# Patient Record
Sex: FEMALE | Race: WHITE | HISPANIC OR LATINO | URBAN - METROPOLITAN AREA
[De-identification: names, ages, dates, MRNs, and addresses within clinical notes are randomized per-mention and may not be internally consistent; named-entity substitution may affect disease eponyms.]

---

## 2017-10-07 ENCOUNTER — INPATIENT (INPATIENT)
Facility: HOSPITAL | Age: 27
LOS: 16 days | Discharge: HOME | End: 2017-10-24
Attending: PSYCHIATRY & NEUROLOGY | Admitting: PSYCHIATRY & NEUROLOGY
Payer: COMMERCIAL

## 2017-10-07 VITALS
TEMPERATURE: 98 F | RESPIRATION RATE: 22 BRPM | OXYGEN SATURATION: 98 % | HEART RATE: 135 BPM | DIASTOLIC BLOOD PRESSURE: 96 MMHG | SYSTOLIC BLOOD PRESSURE: 140 MMHG

## 2017-10-07 LAB
ALBUMIN SERPL ELPH-MCNC: 4.3 G/DL — SIGNIFICANT CHANGE UP (ref 3.3–5)
ALP SERPL-CCNC: 58 U/L — SIGNIFICANT CHANGE UP (ref 40–120)
ALT FLD-CCNC: 55 U/L — HIGH (ref 4–33)
AMORPH CRY # UR COMP ASSIST: SIGNIFICANT CHANGE UP (ref 0–0)
APPEARANCE UR: CLEAR — SIGNIFICANT CHANGE UP
AST SERPL-CCNC: 77 U/L — HIGH (ref 4–32)
BASE EXCESS BLDV CALC-SCNC: 1.2 MMOL/L — SIGNIFICANT CHANGE UP
BASOPHILS # BLD AUTO: 0.06 K/UL — SIGNIFICANT CHANGE UP (ref 0–0.2)
BASOPHILS NFR BLD AUTO: 0.5 % — SIGNIFICANT CHANGE UP (ref 0–2)
BILIRUB SERPL-MCNC: 0.4 MG/DL — SIGNIFICANT CHANGE UP (ref 0.2–1.2)
BILIRUB UR-MCNC: SIGNIFICANT CHANGE UP
BLOOD GAS VENOUS - CREATININE: 0.41 MG/DL — LOW (ref 0.5–1.3)
BLOOD UR QL VISUAL: NEGATIVE — SIGNIFICANT CHANGE UP
BUN SERPL-MCNC: 8 MG/DL — SIGNIFICANT CHANGE UP (ref 7–23)
CALCIUM SERPL-MCNC: 9.2 MG/DL — SIGNIFICANT CHANGE UP (ref 8.4–10.5)
CHLORIDE BLDV-SCNC: 106 MMOL/L — SIGNIFICANT CHANGE UP (ref 96–108)
CHLORIDE SERPL-SCNC: 104 MMOL/L — SIGNIFICANT CHANGE UP (ref 98–107)
CK MB BLD-MCNC: 9.65 NG/ML — HIGH (ref 1–4.7)
CK SERPL-CCNC: 2631 U/L — HIGH (ref 25–170)
CO2 SERPL-SCNC: 22 MMOL/L — SIGNIFICANT CHANGE UP (ref 22–31)
COLOR SPEC: YELLOW — SIGNIFICANT CHANGE UP
CREAT SERPL-MCNC: 0.54 MG/DL — SIGNIFICANT CHANGE UP (ref 0.5–1.3)
EOSINOPHIL # BLD AUTO: 0.12 K/UL — SIGNIFICANT CHANGE UP (ref 0–0.5)
EOSINOPHIL NFR BLD AUTO: 0.9 % — SIGNIFICANT CHANGE UP (ref 0–6)
GAS PNL BLDV: 139 MMOL/L — SIGNIFICANT CHANGE UP (ref 136–146)
GLUCOSE BLDV-MCNC: 130 — HIGH (ref 70–99)
GLUCOSE SERPL-MCNC: 124 MG/DL — HIGH (ref 70–99)
GLUCOSE UR-MCNC: NEGATIVE — SIGNIFICANT CHANGE UP
HCO3 BLDV-SCNC: 26 MMOL/L — SIGNIFICANT CHANGE UP (ref 20–27)
HCT VFR BLD CALC: 41 % — SIGNIFICANT CHANGE UP (ref 34.5–45)
HCT VFR BLDV CALC: 44.1 % — SIGNIFICANT CHANGE UP (ref 34.5–45)
HGB BLD-MCNC: 13.7 G/DL — SIGNIFICANT CHANGE UP (ref 11.5–15.5)
HGB BLDV-MCNC: 14.4 G/DL — SIGNIFICANT CHANGE UP (ref 11.5–15.5)
IMM GRANULOCYTES # BLD AUTO: 0.05 # — SIGNIFICANT CHANGE UP
IMM GRANULOCYTES NFR BLD AUTO: 0.4 % — SIGNIFICANT CHANGE UP (ref 0–1.5)
KETONES UR-MCNC: SIGNIFICANT CHANGE UP
LACTATE BLDV-MCNC: 1 MMOL/L — SIGNIFICANT CHANGE UP (ref 0.5–2)
LEUKOCYTE ESTERASE UR-ACNC: NEGATIVE — SIGNIFICANT CHANGE UP
LYMPHOCYTES # BLD AUTO: 1.76 K/UL — SIGNIFICANT CHANGE UP (ref 1–3.3)
LYMPHOCYTES # BLD AUTO: 13.8 % — SIGNIFICANT CHANGE UP (ref 13–44)
MCHC RBC-ENTMCNC: 30.4 PG — SIGNIFICANT CHANGE UP (ref 27–34)
MCHC RBC-ENTMCNC: 33.4 % — SIGNIFICANT CHANGE UP (ref 32–36)
MCV RBC AUTO: 91.1 FL — SIGNIFICANT CHANGE UP (ref 80–100)
MONOCYTES # BLD AUTO: 1.14 K/UL — HIGH (ref 0–0.9)
MONOCYTES NFR BLD AUTO: 8.9 % — SIGNIFICANT CHANGE UP (ref 2–14)
MUCOUS THREADS # UR AUTO: SIGNIFICANT CHANGE UP
NEUTROPHILS # BLD AUTO: 9.66 K/UL — HIGH (ref 1.8–7.4)
NEUTROPHILS NFR BLD AUTO: 75.5 % — SIGNIFICANT CHANGE UP (ref 43–77)
NITRITE UR-MCNC: NEGATIVE — SIGNIFICANT CHANGE UP
NRBC # FLD: 0 — SIGNIFICANT CHANGE UP
PCO2 BLDV: 38 MMHG — LOW (ref 41–51)
PH BLDV: 7.44 PH — HIGH (ref 7.32–7.43)
PH UR: 6 — SIGNIFICANT CHANGE UP (ref 4.6–8)
PLATELET # BLD AUTO: 312 K/UL — SIGNIFICANT CHANGE UP (ref 150–400)
PMV BLD: 10.8 FL — SIGNIFICANT CHANGE UP (ref 7–13)
PO2 BLDV: 68 MMHG — HIGH (ref 35–40)
POTASSIUM BLDV-SCNC: 3.1 MMOL/L — LOW (ref 3.4–4.5)
POTASSIUM SERPL-MCNC: 3.4 MMOL/L — LOW (ref 3.5–5.3)
POTASSIUM SERPL-SCNC: 3.4 MMOL/L — LOW (ref 3.5–5.3)
PROT SERPL-MCNC: 7.8 G/DL — SIGNIFICANT CHANGE UP (ref 6–8.3)
PROT UR-MCNC: 30 — HIGH
RBC # BLD: 4.5 M/UL — SIGNIFICANT CHANGE UP (ref 3.8–5.2)
RBC # FLD: 12.3 % — SIGNIFICANT CHANGE UP (ref 10.3–14.5)
RBC CASTS # UR COMP ASSIST: SIGNIFICANT CHANGE UP (ref 0–?)
SAO2 % BLDV: 94.7 % — HIGH (ref 60–85)
SODIUM SERPL-SCNC: 142 MMOL/L — SIGNIFICANT CHANGE UP (ref 135–145)
SP GR SPEC: 1.03 — HIGH (ref 1–1.03)
TROPONIN T SERPL-MCNC: < 0.06 NG/ML — SIGNIFICANT CHANGE UP (ref 0–0.06)
UROBILINOGEN FLD QL: >8 E.U. — SIGNIFICANT CHANGE UP (ref 0.1–0.2)
WBC # BLD: 12.79 K/UL — HIGH (ref 3.8–10.5)
WBC # FLD AUTO: 12.79 K/UL — HIGH (ref 3.8–10.5)
WBC UR QL: SIGNIFICANT CHANGE UP (ref 0–?)

## 2017-10-07 PROCEDURE — 71010: CPT | Mod: 26

## 2017-10-07 PROCEDURE — 70450 CT HEAD/BRAIN W/O DYE: CPT | Mod: 26

## 2017-10-07 RX ORDER — SODIUM CHLORIDE 9 MG/ML
1000 INJECTION INTRAMUSCULAR; INTRAVENOUS; SUBCUTANEOUS ONCE
Qty: 0 | Refills: 0 | Status: COMPLETED | OUTPATIENT
Start: 2017-10-07 | End: 2017-10-07

## 2017-10-07 RX ORDER — ACETAMINOPHEN 500 MG
1000 TABLET ORAL ONCE
Qty: 0 | Refills: 0 | Status: COMPLETED | OUTPATIENT
Start: 2017-10-07 | End: 2017-10-07

## 2017-10-07 RX ADMIN — SODIUM CHLORIDE 2000 MILLILITER(S): 9 INJECTION INTRAMUSCULAR; INTRAVENOUS; SUBCUTANEOUS at 22:57

## 2017-10-07 RX ADMIN — Medication 400 MILLIGRAM(S): at 23:44

## 2017-10-07 NOTE — ED PROVIDER NOTE - PHYSICAL EXAMINATION
Gen: NAD, A&O x 1, speaking coherent sentences unrealted to asked questions  Head: NCAT  HEENT: PERRL, oral mucosa moist, normal conjunctiva  Lung: CTAB, no respiratory distress  CV: tachycardic, no murmurs, Normal perfusion  Abd: soft, NTND, no CVA tenderness  MSK: No edema, no visible deformities.  extremities are rigid, but full ROM  Skin: erythema of right hip.  No fluctuance.  scattered excoriations on left arm  Psych: confused  - Prisca Payne, DO

## 2017-10-07 NOTE — ED ADULT NURSE NOTE - OBJECTIVE STATEMENT
Pt received to rm 25 non verbal and non ambulatory. pt has PMH of paranoid schizophrenia. and spastic dysplasia. Has not  slept in four days. pt is on Abilify. As per father pt  says she is very weak and she is not able to walk all day. as per father pt was screaming at the house that she needs help. pt sinus tach on monitor. pt family states she has had an episode like this 2 years ago. Awaiting further eval, will continue to monitor. Pt received to rm 25 non verbal and non ambulatory. pt has PMH of paranoid schizophrenia. and spastic dysplasia. Has not  slept in four days. pt is on Abilify. As per father pt  says she is very weak and she is not able to walk all day. as per father pt was screaming at the house that she needs help. pt sinus tach on monitor. pt noted to have 4cm/4cm abscess on rt hip area. pt family states she has had an episode like this 2 years ago. Awaiting further eval, will continue to monitor.

## 2017-10-07 NOTE — ED PROVIDER NOTE - ATTENDING CONTRIBUTION TO CARE
DELORIS Attending Note - Dr. Toth 27 F pmh schizophrenia, CP p/w inability to sleep, more combative, confused x 4 days similar to previous presentations requiring admission to [facility]   PE: pt is alert but minimally verbal perrl, ent normal, membranes are dry, neck supple. no lymphadenopathy or thyroid enlargement, No JVD.  Chest clear to P&A, Heart- reg rhythm without murmur, rubs or gallops, radial pulses equal bilaterally.  Abd is soft, non-tender, Bowel sounds are active. no mass or organomegaly. : No CVA tenderness. Neuro:  Pt alert and oriented x 3. Perrl    Distal neurosensory is intact. Motor function is 5/5 strength bilaterally.  No focal deficits. Extremities:  No edema.  Skin: warm and dry with indurated lesion on right hip with surrounding erythema..   Impression:  Change in mental status probably psychiatric but with EKG global t wave inversions-  Plan: telemetry admission with psychiatry follow up.

## 2017-10-07 NOTE — ED PROVIDER NOTE - OBJECTIVE STATEMENT
27 F pmh schizophrenia, CP p/w inability to sleep, more combative x 4 days.  Per father patient has had trouble and is refusing to ambulate today.  Normally patient is able to ambulate and bathe on her own.   Pt denies pain.  family denies fever/chills, nausea/vomiting/diarrhea, shortness of breath, chest pain.    Father noticed lesion on right "hip bone" today.  Pt was hospitalized at [facility] 2 years ago and has been on abilify every since.   - Prisca Payne DO 27 F pmh schizophrenia, CP p/w inability to sleep, more combative, confused x 4 days.  Per family, patient has had trouble and is refusing to ambulate today.  family has found her undressed on the floor numerous times over past few days.  Normally patient is able to ambulate and bathe on her own.   Pt selectively answers questions, but mostly makes confused comments.  Per family this is far from the pt's baseline.  family denies patient having fever/chills, nausea/vomiting/diarrhea, shortness of breath, chest pain.    Father noticed lesion/redness on right "hip bone" today.  Pt was hospitalized at [facility] 2 years ago and has been on abilify every since.  This is a very similar presentation to the last time she required [facility] admission.     - Prisca Payne DO

## 2017-10-07 NOTE — ED PROVIDER NOTE - CONSTITUTIONAL, MLM
normal... Well appearing, well nourished female lying on stretcher in no apparent distress but minimally talkative.

## 2017-10-07 NOTE — ED PROVIDER NOTE - MEDICAL DECISION MAKING DETAILS
27 F pmh schizophrenia, CP p/w change in behavior for past few days.  Pt is alert but confused, tachycardic, tachypnic on exam.  Will obtain labs, tox screen, CE, ekg, cxr, CT head to r/o bleed, urine, give fluids, analgesia and re-asses.  If medical work up is negative, patient will be admitted to psychiatry.  - Prisca Payne DO

## 2017-10-07 NOTE — ED ADULT TRIAGE NOTE - CHIEF COMPLAINT QUOTE
PMH of paranoid schzophrenia. and spastic dysplasia. Has not  slept in four days. pt is on abilify. As per father pt  says she is very weak and she is not able to walk all day. pt is cooperative during triage but does not offer much information . as per father pt was screaming at the house that she needs help. HR during triage 135-140. denies any chest pain

## 2017-10-07 NOTE — ED PROVIDER NOTE - ENMT, MLM
Airway patent, Nose No congestion, throat- membranes dry No swelling or exudate. Neck supple. No JVD, No lymphadenopathy  .

## 2017-10-07 NOTE — ED PROVIDER NOTE - PROGRESS NOTE DETAILS
No drainable collection visible on ultrasound of the right hip.    - Prisca Payne DO repeat EKG shows only t wave inversion only in V3 instead of diffuse.  HR and RR have normalized.  BH called. kelly do: pt signed out by dr hawley. pt seen and evaluated at bedside. pt stable, with recurrent tachy cardia, tachypnea. family states same symptoms to previous episode of catatonic phase 2 years ago. family states pt would complain if felt cp. currently no complaints of cp .

## 2017-10-08 DIAGNOSIS — R41.82 ALTERED MENTAL STATUS, UNSPECIFIED: ICD-10-CM

## 2017-10-08 LAB
AMPHET UR-MCNC: NEGATIVE — SIGNIFICANT CHANGE UP
APAP SERPL-MCNC: < 15 UG/ML — LOW (ref 15–25)
BARBITURATES MEASUREMENT: NEGATIVE — SIGNIFICANT CHANGE UP
BARBITURATES UR SCN-MCNC: NEGATIVE — SIGNIFICANT CHANGE UP
BENZODIAZ SERPL-MCNC: NEGATIVE — SIGNIFICANT CHANGE UP
BENZODIAZ UR-MCNC: NEGATIVE — SIGNIFICANT CHANGE UP
CANNABINOIDS UR-MCNC: NEGATIVE — SIGNIFICANT CHANGE UP
COCAINE METAB.OTHER UR-MCNC: NEGATIVE — SIGNIFICANT CHANGE UP
D DIMER BLD IA.RAPID-MCNC: 254 NG/ML — SIGNIFICANT CHANGE UP
ETHANOL BLD-MCNC: < 10 MG/DL — SIGNIFICANT CHANGE UP
METHADONE UR-MCNC: NEGATIVE — SIGNIFICANT CHANGE UP
OPIATES UR-MCNC: NEGATIVE — SIGNIFICANT CHANGE UP
OXYCODONE UR-MCNC: NEGATIVE — SIGNIFICANT CHANGE UP
PCP UR-MCNC: NEGATIVE — SIGNIFICANT CHANGE UP
SALICYLATES SERPL-MCNC: < 5 MG/DL — LOW (ref 15–30)
TSH SERPL-MCNC: 1.82 UIU/ML — SIGNIFICANT CHANGE UP (ref 0.27–4.2)

## 2017-10-08 PROCEDURE — 99285 EMERGENCY DEPT VISIT HI MDM: CPT

## 2017-10-08 RX ORDER — HYDROXYZINE HCL 10 MG
50 TABLET ORAL ONCE
Qty: 0 | Refills: 0 | Status: COMPLETED | OUTPATIENT
Start: 2017-10-08 | End: 2017-10-08

## 2017-10-08 RX ORDER — CEPHALEXIN 500 MG
500 CAPSULE ORAL ONCE
Qty: 0 | Refills: 0 | Status: COMPLETED | OUTPATIENT
Start: 2017-10-08 | End: 2017-10-08

## 2017-10-08 RX ORDER — ARIPIPRAZOLE 15 MG/1
15 TABLET ORAL DAILY
Qty: 0 | Refills: 0 | Status: DISCONTINUED | OUTPATIENT
Start: 2017-10-08 | End: 2017-10-24

## 2017-10-08 RX ORDER — HALOPERIDOL DECANOATE 100 MG/ML
5 INJECTION INTRAMUSCULAR ONCE
Qty: 0 | Refills: 0 | Status: DISCONTINUED | OUTPATIENT
Start: 2017-10-08 | End: 2017-10-24

## 2017-10-08 RX ORDER — ARIPIPRAZOLE 15 MG/1
15 TABLET ORAL DAILY
Qty: 0 | Refills: 0 | Status: DISCONTINUED | OUTPATIENT
Start: 2017-10-08 | End: 2017-10-08

## 2017-10-08 RX ORDER — BENZTROPINE MESYLATE 1 MG
1 TABLET ORAL ONCE
Qty: 0 | Refills: 0 | Status: DISCONTINUED | OUTPATIENT
Start: 2017-10-08 | End: 2017-10-24

## 2017-10-08 RX ORDER — BENZTROPINE MESYLATE 1 MG
0.5 TABLET ORAL
Qty: 0 | Refills: 0 | Status: COMPLETED | OUTPATIENT
Start: 2017-10-08 | End: 2017-10-08

## 2017-10-08 RX ORDER — SODIUM CHLORIDE 9 MG/ML
1000 INJECTION INTRAMUSCULAR; INTRAVENOUS; SUBCUTANEOUS ONCE
Qty: 0 | Refills: 0 | Status: COMPLETED | OUTPATIENT
Start: 2017-10-08 | End: 2017-10-08

## 2017-10-08 RX ADMIN — SODIUM CHLORIDE 2000 MILLILITER(S): 9 INJECTION INTRAMUSCULAR; INTRAVENOUS; SUBCUTANEOUS at 01:42

## 2017-10-08 RX ADMIN — Medication 500 MILLIGRAM(S): at 01:22

## 2017-10-08 RX ADMIN — Medication 50 MILLIGRAM(S): at 23:30

## 2017-10-08 RX ADMIN — Medication 1000 MILLIGRAM(S): at 01:14

## 2017-10-08 RX ADMIN — Medication 0.5 MILLIGRAM(S): at 21:01

## 2017-10-08 RX ADMIN — Medication 2 MILLIGRAM(S): at 06:27

## 2017-10-08 NOTE — ED BEHAVIORAL HEALTH ASSESSMENT NOTE - OTHER PAST PSYCHIATRIC HISTORY (INCLUDE DETAILS REGARDING ONSET, COURSE OF ILLNESS, INPATIENT/OUTPATIENT TREATMENT)
Hospitalization with diagnosis of Schizophrenia about 5 years ago. Hospitalization with diagnosis of Schizophrenia about 2 years ago. 2 psych admissions No Hx of SA, was in treatment in [facility] [place] under care of Dr Osullivan Last seen in Jan 2017

## 2017-10-08 NOTE — ED ADULT NURSE REASSESSMENT NOTE - NS ED NURSE REASSESS COMMENT FT1
Pt a&ox2, breathing unlabored, vs as noted. Pt being transferred to  for psych evaluation. Pt calm and cooperative.

## 2017-10-08 NOTE — ED BEHAVIORAL HEALTH ASSESSMENT NOTE - OTHER
NEENA cerebral palsy, in wheelchair cerebral palsy selectively mute constricted disorganized, internally preoccupied with long latency to speech or selective mutism - unable to assess TP unable to assess internally preoccupied NENEA father psychosis pt is taking her meds but not seen by a psychiatrist for 8 month because insurance issues

## 2017-10-08 NOTE — ED BEHAVIORAL HEALTH ASSESSMENT NOTE - HPI (INCLUDE ILLNESS QUALITY, SEVERITY, DURATION, TIMING, CONTEXT, MODIFYING FACTORS, ASSOCIATED SIGNS AND SYMPTOMS)
Case of 35 y/o woman with Cerebral Palsy and a diagnosis of Schizophrenia was brought in by her parents for a psychiatric evaluation.  She lives with them, is unemployed, and has a history of one previous psychiatric hospitalization about 5 years ago for similar symptoms.  She had been in her usual state of health, stabilized on Seroquel and she started to be non-compliant with her Seroquel.  Parents found that she was starting to stay awake at night as well as go into the bathroom frequently.  They found she had been placing Seroquel tabs under the toaster instead of taking them.  Patient was selectively mute for the interview and appeared internally preoccupied.  Parents stated that she was not sleeping and that they had to take turns watching her.  They also reported that her appetite is about half of what it is usually, and that she is taking a very long time to eat her food, which is not like her.  Patient was going into the bathroom "all the time" and they found that she was urinating, then drinking water out of the sink "as much as she could."  Patient would not answer questions about it.  Father also reported that he found his straight razor broken in the garbage, and that in questioning the patient found out that the patient had shaved her underarms and her pubic area.  Patient again would not answer questions about it.  Brother was contacted who gave history that the patient had hidden some razors around her room, in drawer, in jewelry box, etc.  Patient denied wanting to hurt herself or kill herself, had told family she just wanted to shave.    Parents report that the patient may have become upset a few weeks ago when one of her brothers moved in with the Grandmother and the patient started to worry that she would be left all alone with no one to care for her.  She denied any depressed mood or anxiety.  She denied having any different thoughts about her body or touching herself in any different way, but she did place her left hand over her right breast at one point during the interview, then slid it off.  Patient denied any AH/VH/PI but did appear internally preoccupied during the interview.  She was selectively mute but did answer some simple questions with "no".  The brother also gave history that the patient had been speaking non-sensically while they were having conversation.  Patient is psychotic and unable to communicate her needs effectively at this time.  Her psychosis also interferes with her properly caring for herself.  Due to these, she is an acute danger to herself at this time and requires inpatient psychiatric hospitalization. Case of 26 y/o woman with Cerebral Palsy and a diagnosis of Schizophrenia was brought in by her parents for a psychiatric evaluation.  She lives with them, is unemployed, and has a history of one previous psychiatric hospitalization about 2 years ago for similar symptoms.  Patient has not been seen by a psychiatrist for 8 month, but father claims that patient has been compliant with her meds.  Parents found that she was starting to stay awake at night.  Patient was selectively mute for the interview and appeared internally preoccupied.  She reported that she is here because she has parkinson's.  Parents also reported that patient "makes no sense sometimes; she interrupts others and says "something which does not make sense, she can start talking about some event which happened 6 month ago"  Recently patient has been more irritable and as per the father she was found naked in her room on the floor "couple of times" ; he states she can walk naked at home. He also reports that her daughter talks to herself and  that her appetite is about half of what it is usually, and that she is taking a very long time to eat her food, which is not like her.   patient reports that he does not cry ; denies being angry; states that she does not want to hurt anyone or herself, She denies hearing voices, no visions or delusions.  She denied any depressed mood or anxiety.  She appears internally preoccupied during the interview.  She was selectively mute but did answer some simple questions with "no".

## 2017-10-08 NOTE — ED BEHAVIORAL HEALTH ASSESSMENT NOTE - RISK ASSESSMENT
Patient is psychotic and unable to communicate her needs effectively at this time.  Her psychosis also interferes with her properly caring for herself.  Due to these, she is an acute danger to herself at this time and requires inpatient psychiatric hospitalization.

## 2017-10-08 NOTE — ED BEHAVIORAL HEALTH ASSESSMENT NOTE - SUMMARY
Case of 24 y/o woman with CP and Schizophrenia who has been non-compliant with her antipsychotic medications for at least one week.  Now she presents with internal preoccupation, disorganized thinking, bizarre behavior, and selective mutism.  Patient is psychotic and unable to communicate her needs effectively at this time.  Her psychosis also interferes with her properly caring for herself.  Due to these, she is an acute danger to herself at this time and requires inpatient psychiatric hospitalization. Case of 28 y/o woman with CP and Schizophrenia 2 prior psych admissions, no SA ; domiciled with her family. who has been compliant with her meds, but not under care of a psychiatrist lately LAst seen in Jan 2017.  Now she presents with internal preoccupation, disorganized thinking, bizarre behavior, and selective mutism.  Patient is psychotic and unable to communicate her needs effectively at this time.  Her psychosis also interferes with her properly caring for herself.  Due to these, she is an acute danger to herself at this time and requires inpatient psychiatric hospitalization.

## 2017-10-09 PROCEDURE — 93010 ELECTROCARDIOGRAM REPORT: CPT

## 2017-10-09 PROCEDURE — 99222 1ST HOSP IP/OBS MODERATE 55: CPT

## 2017-10-09 RX ORDER — BENZTROPINE MESYLATE 1 MG
1 TABLET ORAL
Qty: 0 | Refills: 0 | Status: DISCONTINUED | OUTPATIENT
Start: 2017-10-09 | End: 2017-10-24

## 2017-10-09 RX ADMIN — Medication 1 MILLIGRAM(S): at 12:46

## 2017-10-09 RX ADMIN — Medication 1 MILLIGRAM(S): at 20:09

## 2017-10-09 RX ADMIN — ARIPIPRAZOLE 15 MILLIGRAM(S): 15 TABLET ORAL at 08:43

## 2017-10-10 DIAGNOSIS — F06.1 CATATONIC DISORDER DUE TO KNOWN PHYSIOLOGICAL CONDITION: ICD-10-CM

## 2017-10-10 DIAGNOSIS — R00.0 TACHYCARDIA, UNSPECIFIED: ICD-10-CM

## 2017-10-10 DIAGNOSIS — R94.31 ABNORMAL ELECTROCARDIOGRAM [ECG] [EKG]: ICD-10-CM

## 2017-10-10 DIAGNOSIS — M62.82 RHABDOMYOLYSIS: ICD-10-CM

## 2017-10-10 LAB
ALBUMIN SERPL ELPH-MCNC: 4.3 G/DL — SIGNIFICANT CHANGE UP (ref 3.3–5)
ALP SERPL-CCNC: 53 U/L — SIGNIFICANT CHANGE UP (ref 40–120)
ALT FLD-CCNC: 43 U/L — HIGH (ref 4–33)
AST SERPL-CCNC: 38 U/L — HIGH (ref 4–32)
BILIRUB SERPL-MCNC: 0.6 MG/DL — SIGNIFICANT CHANGE UP (ref 0.2–1.2)
BUN SERPL-MCNC: 4 MG/DL — LOW (ref 7–23)
CALCIUM SERPL-MCNC: 9.5 MG/DL — SIGNIFICANT CHANGE UP (ref 8.4–10.5)
CHLORIDE SERPL-SCNC: 101 MMOL/L — SIGNIFICANT CHANGE UP (ref 98–107)
CK SERPL-CCNC: 608 U/L — HIGH (ref 25–170)
CO2 SERPL-SCNC: 22 MMOL/L — SIGNIFICANT CHANGE UP (ref 22–31)
CREAT SERPL-MCNC: 0.6 MG/DL — SIGNIFICANT CHANGE UP (ref 0.5–1.3)
GLUCOSE SERPL-MCNC: 97 MG/DL — SIGNIFICANT CHANGE UP (ref 70–99)
HCT VFR BLD CALC: 44.2 % — SIGNIFICANT CHANGE UP (ref 34.5–45)
HGB BLD-MCNC: 14.4 G/DL — SIGNIFICANT CHANGE UP (ref 11.5–15.5)
MCHC RBC-ENTMCNC: 30.6 PG — SIGNIFICANT CHANGE UP (ref 27–34)
MCHC RBC-ENTMCNC: 32.6 % — SIGNIFICANT CHANGE UP (ref 32–36)
MCV RBC AUTO: 94 FL — SIGNIFICANT CHANGE UP (ref 80–100)
NRBC # FLD: 0 — SIGNIFICANT CHANGE UP
PLATELET # BLD AUTO: 255 K/UL — SIGNIFICANT CHANGE UP (ref 150–400)
PMV BLD: 11.5 FL — SIGNIFICANT CHANGE UP (ref 7–13)
POTASSIUM SERPL-MCNC: 3.9 MMOL/L — SIGNIFICANT CHANGE UP (ref 3.5–5.3)
POTASSIUM SERPL-SCNC: 3.9 MMOL/L — SIGNIFICANT CHANGE UP (ref 3.5–5.3)
PROT SERPL-MCNC: 7.9 G/DL — SIGNIFICANT CHANGE UP (ref 6–8.3)
RBC # BLD: 4.7 M/UL — SIGNIFICANT CHANGE UP (ref 3.8–5.2)
RBC # FLD: 12.5 % — SIGNIFICANT CHANGE UP (ref 10.3–14.5)
SODIUM SERPL-SCNC: 142 MMOL/L — SIGNIFICANT CHANGE UP (ref 135–145)
WBC # BLD: 9.35 K/UL — SIGNIFICANT CHANGE UP (ref 3.8–10.5)
WBC # FLD AUTO: 9.35 K/UL — SIGNIFICANT CHANGE UP (ref 3.8–10.5)

## 2017-10-10 PROCEDURE — 99232 SBSQ HOSP IP/OBS MODERATE 35: CPT

## 2017-10-10 PROCEDURE — 99223 1ST HOSP IP/OBS HIGH 75: CPT

## 2017-10-10 RX ADMIN — ARIPIPRAZOLE 15 MILLIGRAM(S): 15 TABLET ORAL at 10:56

## 2017-10-10 RX ADMIN — Medication 30 MILLILITER(S): at 21:27

## 2017-10-10 RX ADMIN — Medication 1 MILLIGRAM(S): at 21:27

## 2017-10-10 RX ADMIN — Medication 1 MILLIGRAM(S): at 15:24

## 2017-10-10 RX ADMIN — Medication 1 MILLIGRAM(S): at 10:41

## 2017-10-10 NOTE — CONSULT NOTE ADULT - PROBLEM SELECTOR RECOMMENDATION 2
-Isolated t wave inversion in V3  -Patient denies any chest pain or SOB  -No prior cardiac history and negative troponin in ED  -Monitor for now

## 2017-10-10 NOTE — CONSULT NOTE ADULT - PROBLEM SELECTOR RECOMMENDATION 4
-Improving. Patient worked with PT yesterday and with overall improving PO take  -management per psych  -case discussed with Dr Barahona.

## 2017-10-10 NOTE — CONSULT NOTE ADULT - SUBJECTIVE AND OBJECTIVE BOX
HPI: 27F PMH of schizophrenia, cerebral palsy p/w altered mental status. Per family patient was not sleeping, refusing to ambulate and was selectively conversing. In ED patient was found to have tachycardia which improved with IV hydration and she was found to be medically stable to DC to [facility]. Here she has improved and is able to answer some questions. Currently she denies any discomfort, any chest pain or SOB. Yesterday she worked with PT and today she is eating more per the staff. History otherwise was limited to obtain.      PAST MEDICAL & SURGICAL HISTORY:  Schizophrenia  Cerebral palsy  No significant past surgical history      Review of Systems:   Only able to obtain limited history as patient selective answers questions.     Allergies    No Known Drug Allergies  peanuts (Anaphylaxis; Hives)    Social History: Lives with father in private residence. Needs wheelchair at home.     FAMILY HISTORY:  No pertinent family history in first degree relatives      MEDICATIONS  (STANDING):  ARIPiprazole Solution 15 milliGRAM(s) Oral daily  benztropine 1 milliGRAM(s) Oral two times a day  LORazepam     Tablet 1 milliGRAM(s) Oral three times a day    MEDICATIONS  (PRN):  benztropine Injectable 1 milliGRAM(s) IntraMuscular Once PRN Extrapyramidal symptoms  haloperidol    Injectable 5 milliGRAM(s) IntraMuscular Once PRN severe agitation  LORazepam   Injectable 2 milliGRAM(s) IntraMuscular once PRN severe agitation      Vital Signs Last 24 Hrs  T(C): 37 (10 Oct 2017 06:55), Max: 37 (10 Oct 2017 06:55)  T(F): 98.6 (10 Oct 2017 06:55), Max: 98.6 (10 Oct 2017 06:55)  HR: 103 (10 Oct 2017 06:55) (103 - 103)  BP: 134/96 (10 Oct 2017 06:55) (134/96 - 134/96)  BP(mean): --  RR: --  SpO2: --    PHYSICAL EXAM:  GENERAL: NAD, well-developed  HEAD:  Atraumatic, Normocephalic  EYES: EOMI, PERRLA, conjunctiva and sclera clear  NECK: Supple, No JVD  CHEST/LUNG: Clear to auscultation bilaterally; No wheeze  HEART: + tachycardia   ABDOMEN: Soft, Nontender, Nondistended; Bowel sounds present  EXTREMITIES:  2+ Peripheral Pulses, No clubbing, cyanosis, or edema  Psych - calm  NEUROLOGY: non-focal  SKIN: No rashes or lesions    LABS:                        14.4   9.35  )-----------( 255      ( 10 Oct 2017 08:20 )             44.2         EKG(personally reviewed): Sinus tach  only isolated t wave inversion seen in V3     RADIOLOGY & ADDITIONAL TESTS:    Imaging Personally Reviewed:    Consultant(s) Notes Reviewed:      Care Discussed with Consultants/Other Providers: HPI: 27F PMH of schizophrenia, cerebral palsy p/w altered mental status. Per family patient was not sleeping, refusing to ambulate and was selectively conversing. In ED patient was found to have tachycardia which improved with IV hydration and she was found to be medically stable to DC to [facility]. Here she has improved and is able to answer some questions. Currently she denies any discomfort, any chest pain or SOB. Yesterday she worked with PT and today she is eating more per the staff. History otherwise was limited to obtain.      PAST MEDICAL & SURGICAL HISTORY:  Schizophrenia  Cerebral palsy  No significant past surgical history      Review of Systems:   Only able to obtain limited history as patient selective answers questions.     Allergies    No Known Drug Allergies  peanuts (Anaphylaxis; Hives)    Social History: Lives with father in private residence. Needs wheelchair at home.     FAMILY HISTORY:  No pertinent family history in first degree relatives      MEDICATIONS  (STANDING):  ARIPiprazole Solution 15 milliGRAM(s) Oral daily  benztropine 1 milliGRAM(s) Oral two times a day  LORazepam     Tablet 1 milliGRAM(s) Oral three times a day    MEDICATIONS  (PRN):  benztropine Injectable 1 milliGRAM(s) IntraMuscular Once PRN Extrapyramidal symptoms  haloperidol    Injectable 5 milliGRAM(s) IntraMuscular Once PRN severe agitation  LORazepam   Injectable 2 milliGRAM(s) IntraMuscular once PRN severe agitation      Vital Signs Last 24 Hrs  T(C): 37 (10 Oct 2017 06:55), Max: 37 (10 Oct 2017 06:55)  T(F): 98.6 (10 Oct 2017 06:55), Max: 98.6 (10 Oct 2017 06:55)  HR: 103 (10 Oct 2017 06:55) (103 - 103)  BP: 134/96 (10 Oct 2017 06:55) (134/96 - 134/96)  BP(mean): --  RR: --  SpO2: --    PHYSICAL EXAM:  GENERAL: NAD, well-developed  HEAD:  Atraumatic, Normocephalic  EYES: EOMI, PERRLA, conjunctiva and sclera clear  NECK: Supple, No JVD  CHEST/LUNG: Clear to auscultation bilaterally; No wheeze  HEART: + tachycardia   ABDOMEN: Soft, Nontender, Nondistended; Bowel sounds present  EXTREMITIES:  2+ Peripheral Pulses, No clubbing, cyanosis, or edema  Psych - calm  NEUROLOGY: non-focal  SKIN: No rashes or lesions    LABS:                        14.4   9.35  )-----------( 255      ( 10 Oct 2017 08:20 )             44.2     10-10    142  |  101  |  4<L>  ----------------------------<  97  3.9   |  22  |  0.60    Ca    9.5      10 Oct 2017 08:20    TPro  7.9  /  Alb  4.3  /  TBili  0.6  /  DBili  x   /  AST  38<H>  /  ALT  43<H>  /  AlkPhos  53  10-10        EKG(personally reviewed): Sinus tach  only isolated t wave inversion seen in V3     RADIOLOGY & ADDITIONAL TESTS:    Imaging Personally Reviewed:    Consultant(s) Notes Reviewed:      Care Discussed with Consultants/Other Providers:

## 2017-10-10 NOTE — CONSULT NOTE ADULT - PROBLEM SELECTOR RECOMMENDATION 3
-elevated CK  -Repeat CK pending today  -Encourage oral hydration if tolerates PO intake. Per family patient able to tolerate thin liquids at home. -elevated CK  -CK downtrending today  -Encourage oral hydration if tolerates PO intake. Per family patient able to tolerate thin liquids at home.

## 2017-10-10 NOTE — CONSULT NOTE ADULT - PROBLEM SELECTOR RECOMMENDATION 9
-Likely in setting of poor PO intake  -patient denies any pain or discomfort  -Appears dehydrated upon review of labs - elevated specific gravity and elevated CK  -As per mental status is improving, I recommend encouraging PO intake. Trial of fluids if able to tolerate. -Likely in setting of poor PO intake  -patient denies any pain or discomfort  -Appears dehydrated upon review of labs - elevated specific gravity and elevated CK  -As per mental status is improving, I recommend encouraging PO intake. Trial of fluids if able to tolerate. BMP reviewed from this morning and is stable.

## 2017-10-10 NOTE — CONSULT NOTE ADULT - ASSESSMENT
27F PMH of schizophrenia, cerebral palsy p/w altered mental status found to have sinus tach likely due to dehydration in setting of poor PO take.

## 2017-10-11 PROCEDURE — 99232 SBSQ HOSP IP/OBS MODERATE 35: CPT

## 2017-10-11 RX ORDER — POLYETHYLENE GLYCOL 3350 17 G/17G
17 POWDER, FOR SOLUTION ORAL ONCE
Qty: 0 | Refills: 0 | Status: COMPLETED | OUTPATIENT
Start: 2017-10-11 | End: 2017-10-12

## 2017-10-11 RX ADMIN — Medication 1 MILLIGRAM(S): at 08:10

## 2017-10-11 RX ADMIN — ARIPIPRAZOLE 15 MILLIGRAM(S): 15 TABLET ORAL at 08:10

## 2017-10-11 RX ADMIN — Medication 1 MILLIGRAM(S): at 21:17

## 2017-10-11 RX ADMIN — Medication 1 MILLIGRAM(S): at 12:33

## 2017-10-12 PROCEDURE — 99231 SBSQ HOSP IP/OBS SF/LOW 25: CPT

## 2017-10-12 RX ADMIN — Medication 1 MILLIGRAM(S): at 21:45

## 2017-10-12 RX ADMIN — Medication 1 MILLIGRAM(S): at 08:27

## 2017-10-12 RX ADMIN — ARIPIPRAZOLE 15 MILLIGRAM(S): 15 TABLET ORAL at 08:27

## 2017-10-12 RX ADMIN — POLYETHYLENE GLYCOL 3350 17 GRAM(S): 17 POWDER, FOR SOLUTION ORAL at 21:00

## 2017-10-12 RX ADMIN — Medication 1 MILLIGRAM(S): at 13:25

## 2017-10-13 PROCEDURE — 99232 SBSQ HOSP IP/OBS MODERATE 35: CPT

## 2017-10-13 RX ADMIN — Medication 1 MILLIGRAM(S): at 12:41

## 2017-10-13 RX ADMIN — ARIPIPRAZOLE 15 MILLIGRAM(S): 15 TABLET ORAL at 08:21

## 2017-10-13 RX ADMIN — Medication 1 MILLIGRAM(S): at 21:44

## 2017-10-13 RX ADMIN — Medication 1 MILLIGRAM(S): at 08:21

## 2017-10-14 PROCEDURE — 99232 SBSQ HOSP IP/OBS MODERATE 35: CPT

## 2017-10-14 RX ADMIN — Medication 1 MILLIGRAM(S): at 13:39

## 2017-10-14 RX ADMIN — ARIPIPRAZOLE 15 MILLIGRAM(S): 15 TABLET ORAL at 09:38

## 2017-10-14 RX ADMIN — Medication 1 MILLIGRAM(S): at 20:24

## 2017-10-14 RX ADMIN — Medication 1 MILLIGRAM(S): at 09:38

## 2017-10-15 PROCEDURE — 99232 SBSQ HOSP IP/OBS MODERATE 35: CPT

## 2017-10-15 RX ADMIN — Medication 1 MILLIGRAM(S): at 13:42

## 2017-10-15 RX ADMIN — Medication 1 MILLIGRAM(S): at 09:23

## 2017-10-15 RX ADMIN — ARIPIPRAZOLE 15 MILLIGRAM(S): 15 TABLET ORAL at 09:23

## 2017-10-15 RX ADMIN — Medication 1 MILLIGRAM(S): at 21:14

## 2017-10-16 PROCEDURE — 99231 SBSQ HOSP IP/OBS SF/LOW 25: CPT

## 2017-10-16 RX ADMIN — Medication 1 MILLIGRAM(S): at 22:03

## 2017-10-16 RX ADMIN — Medication 1 MILLIGRAM(S): at 12:44

## 2017-10-16 RX ADMIN — ARIPIPRAZOLE 15 MILLIGRAM(S): 15 TABLET ORAL at 10:25

## 2017-10-16 RX ADMIN — Medication 1 MILLIGRAM(S): at 10:25

## 2017-10-17 PROCEDURE — 99231 SBSQ HOSP IP/OBS SF/LOW 25: CPT

## 2017-10-17 RX ADMIN — Medication 1 MILLIGRAM(S): at 22:21

## 2017-10-17 RX ADMIN — Medication 1 MILLIGRAM(S): at 13:11

## 2017-10-17 RX ADMIN — Medication 1 MILLIGRAM(S): at 08:12

## 2017-10-17 RX ADMIN — ARIPIPRAZOLE 15 MILLIGRAM(S): 15 TABLET ORAL at 08:12

## 2017-10-18 PROCEDURE — 99231 SBSQ HOSP IP/OBS SF/LOW 25: CPT

## 2017-10-18 RX ADMIN — ARIPIPRAZOLE 15 MILLIGRAM(S): 15 TABLET ORAL at 08:25

## 2017-10-18 RX ADMIN — Medication 1 MILLIGRAM(S): at 21:00

## 2017-10-18 RX ADMIN — Medication 0.5 MILLIGRAM(S): at 12:51

## 2017-10-18 RX ADMIN — Medication 0.5 MILLIGRAM(S): at 08:25

## 2017-10-18 RX ADMIN — Medication 1 MILLIGRAM(S): at 08:25

## 2017-10-19 PROCEDURE — 99231 SBSQ HOSP IP/OBS SF/LOW 25: CPT | Mod: 25

## 2017-10-19 PROCEDURE — 90853 GROUP PSYCHOTHERAPY: CPT

## 2017-10-19 RX ADMIN — Medication 1 MILLIGRAM(S): at 20:32

## 2017-10-19 RX ADMIN — ARIPIPRAZOLE 15 MILLIGRAM(S): 15 TABLET ORAL at 10:53

## 2017-10-19 RX ADMIN — Medication 1 MILLIGRAM(S): at 08:52

## 2017-10-19 RX ADMIN — Medication 0.5 MILLIGRAM(S): at 08:52

## 2017-10-19 RX ADMIN — Medication 0.5 MILLIGRAM(S): at 12:26

## 2017-10-20 PROCEDURE — 99231 SBSQ HOSP IP/OBS SF/LOW 25: CPT

## 2017-10-20 RX ADMIN — Medication 0.5 MILLIGRAM(S): at 13:23

## 2017-10-20 RX ADMIN — Medication 0.5 MILLIGRAM(S): at 09:45

## 2017-10-20 RX ADMIN — ARIPIPRAZOLE 15 MILLIGRAM(S): 15 TABLET ORAL at 09:45

## 2017-10-20 RX ADMIN — Medication 1 MILLIGRAM(S): at 21:35

## 2017-10-20 RX ADMIN — Medication 1 MILLIGRAM(S): at 09:45

## 2017-10-21 RX ADMIN — Medication 1 MILLIGRAM(S): at 21:07

## 2017-10-21 RX ADMIN — Medication 0.5 MILLIGRAM(S): at 09:05

## 2017-10-21 RX ADMIN — Medication 0.5 MILLIGRAM(S): at 13:20

## 2017-10-21 RX ADMIN — ARIPIPRAZOLE 15 MILLIGRAM(S): 15 TABLET ORAL at 09:05

## 2017-10-21 RX ADMIN — Medication 1 MILLIGRAM(S): at 09:05

## 2017-10-22 RX ADMIN — Medication 0.5 MILLIGRAM(S): at 15:03

## 2017-10-22 RX ADMIN — ARIPIPRAZOLE 15 MILLIGRAM(S): 15 TABLET ORAL at 09:51

## 2017-10-22 RX ADMIN — Medication 1 MILLIGRAM(S): at 09:51

## 2017-10-22 RX ADMIN — Medication 1 MILLIGRAM(S): at 20:46

## 2017-10-22 RX ADMIN — Medication 0.5 MILLIGRAM(S): at 09:51

## 2017-10-23 PROCEDURE — 99232 SBSQ HOSP IP/OBS MODERATE 35: CPT

## 2017-10-23 RX ORDER — ARIPIPRAZOLE 15 MG/1
15 TABLET ORAL
Qty: 225 | Refills: 0 | OUTPATIENT
Start: 2017-10-23 | End: 2017-11-07

## 2017-10-23 RX ORDER — BENZTROPINE MESYLATE 1 MG
1 TABLET ORAL
Qty: 30 | Refills: 0 | OUTPATIENT
Start: 2017-10-23 | End: 2017-11-07

## 2017-10-23 RX ADMIN — Medication 1 MILLIGRAM(S): at 11:06

## 2017-10-23 RX ADMIN — Medication 0.5 MILLIGRAM(S): at 13:24

## 2017-10-23 RX ADMIN — Medication 0.5 MILLIGRAM(S): at 11:06

## 2017-10-23 RX ADMIN — Medication 1 MILLIGRAM(S): at 20:50

## 2017-10-23 RX ADMIN — ARIPIPRAZOLE 15 MILLIGRAM(S): 15 TABLET ORAL at 11:06

## 2017-10-24 VITALS — TEMPERATURE: 98 F

## 2017-10-24 PROCEDURE — 99238 HOSP IP/OBS DSCHRG MGMT 30/<: CPT

## 2017-10-24 RX ADMIN — Medication 0.5 MILLIGRAM(S): at 13:09

## 2017-10-24 RX ADMIN — Medication 0.5 MILLIGRAM(S): at 10:24

## 2017-10-24 RX ADMIN — ARIPIPRAZOLE 15 MILLIGRAM(S): 15 TABLET ORAL at 10:24

## 2017-10-24 RX ADMIN — Medication 1 MILLIGRAM(S): at 10:24

## 2017-10-27 ENCOUNTER — OUTPATIENT (OUTPATIENT)
Dept: OUTPATIENT SERVICES | Facility: HOSPITAL | Age: 27
LOS: 1 days | Discharge: STILL ADMITTED OR ADMINISTRATIVE | End: 2017-10-27

## 2017-10-30 DIAGNOSIS — F20.9 SCHIZOPHRENIA, UNSPECIFIED: ICD-10-CM

## 2017-11-10 ENCOUNTER — OUTPATIENT (OUTPATIENT)
Dept: OUTPATIENT SERVICES | Facility: HOSPITAL | Age: 27
LOS: 1 days | Discharge: HOME | End: 2017-11-10
Payer: SELF-PAY

## 2017-11-13 ENCOUNTER — EMERGENCY (EMERGENCY)
Facility: HOSPITAL | Age: 27
LOS: 1 days | Discharge: HOME | End: 2017-11-13
Admitting: EMERGENCY MEDICINE
Payer: COMMERCIAL

## 2017-11-13 VITALS
SYSTOLIC BLOOD PRESSURE: 127 MMHG | OXYGEN SATURATION: 99 % | TEMPERATURE: 98 F | DIASTOLIC BLOOD PRESSURE: 83 MMHG | HEART RATE: 88 BPM | RESPIRATION RATE: 18 BRPM

## 2017-11-13 DIAGNOSIS — F20.9 SCHIZOPHRENIA, UNSPECIFIED: ICD-10-CM

## 2017-11-13 LAB
ALBUMIN SERPL ELPH-MCNC: 4.4 G/DL — SIGNIFICANT CHANGE UP (ref 3.3–5)
ALP SERPL-CCNC: 49 U/L — SIGNIFICANT CHANGE UP (ref 40–120)
ALT FLD-CCNC: 12 U/L — SIGNIFICANT CHANGE UP (ref 4–33)
AMPHET UR-MCNC: NEGATIVE — SIGNIFICANT CHANGE UP
APAP SERPL-MCNC: < 15 UG/ML — LOW (ref 15–25)
APPEARANCE UR: CLEAR — SIGNIFICANT CHANGE UP
AST SERPL-CCNC: 12 U/L — SIGNIFICANT CHANGE UP (ref 4–32)
BACTERIA # UR AUTO: SIGNIFICANT CHANGE UP
BARBITURATES MEASUREMENT: NEGATIVE — SIGNIFICANT CHANGE UP
BARBITURATES UR SCN-MCNC: NEGATIVE — SIGNIFICANT CHANGE UP
BASOPHILS # BLD AUTO: 0.08 K/UL — SIGNIFICANT CHANGE UP (ref 0–0.2)
BASOPHILS NFR BLD AUTO: 0.8 % — SIGNIFICANT CHANGE UP (ref 0–2)
BENZODIAZ SERPL-MCNC: NEGATIVE — SIGNIFICANT CHANGE UP
BENZODIAZ UR-MCNC: NEGATIVE — SIGNIFICANT CHANGE UP
BILIRUB SERPL-MCNC: 0.3 MG/DL — SIGNIFICANT CHANGE UP (ref 0.2–1.2)
BILIRUB UR-MCNC: NEGATIVE — SIGNIFICANT CHANGE UP
BLOOD UR QL VISUAL: NEGATIVE — SIGNIFICANT CHANGE UP
BUN SERPL-MCNC: 8 MG/DL — SIGNIFICANT CHANGE UP (ref 7–23)
CALCIUM SERPL-MCNC: 9.4 MG/DL — SIGNIFICANT CHANGE UP (ref 8.4–10.5)
CANNABINOIDS UR-MCNC: NEGATIVE — SIGNIFICANT CHANGE UP
CHLORIDE SERPL-SCNC: 95 MMOL/L — LOW (ref 98–107)
CO2 SERPL-SCNC: 24 MMOL/L — SIGNIFICANT CHANGE UP (ref 22–31)
COCAINE METAB.OTHER UR-MCNC: NEGATIVE — SIGNIFICANT CHANGE UP
COLOR SPEC: YELLOW — SIGNIFICANT CHANGE UP
CREAT SERPL-MCNC: 0.73 MG/DL — SIGNIFICANT CHANGE UP (ref 0.5–1.3)
EOSINOPHIL # BLD AUTO: 0.16 K/UL — SIGNIFICANT CHANGE UP (ref 0–0.5)
EOSINOPHIL NFR BLD AUTO: 1.5 % — SIGNIFICANT CHANGE UP (ref 0–6)
ETHANOL BLD-MCNC: < 10 MG/DL — SIGNIFICANT CHANGE UP
GLUCOSE SERPL-MCNC: 87 MG/DL — SIGNIFICANT CHANGE UP (ref 70–99)
GLUCOSE UR-MCNC: NEGATIVE — SIGNIFICANT CHANGE UP
HCG SERPL-ACNC: < 5 MIU/ML — SIGNIFICANT CHANGE UP
HCT VFR BLD CALC: 41.1 % — SIGNIFICANT CHANGE UP (ref 34.5–45)
HGB BLD-MCNC: 13.3 G/DL — SIGNIFICANT CHANGE UP (ref 11.5–15.5)
IMM GRANULOCYTES # BLD AUTO: 0.03 # — SIGNIFICANT CHANGE UP
IMM GRANULOCYTES NFR BLD AUTO: 0.3 % — SIGNIFICANT CHANGE UP (ref 0–1.5)
KETONES UR-MCNC: NEGATIVE — SIGNIFICANT CHANGE UP
LEUKOCYTE ESTERASE UR-ACNC: SIGNIFICANT CHANGE UP
LYMPHOCYTES # BLD AUTO: 3.35 K/UL — HIGH (ref 1–3.3)
LYMPHOCYTES # BLD AUTO: 31.9 % — SIGNIFICANT CHANGE UP (ref 13–44)
MCHC RBC-ENTMCNC: 30.6 PG — SIGNIFICANT CHANGE UP (ref 27–34)
MCHC RBC-ENTMCNC: 32.4 % — SIGNIFICANT CHANGE UP (ref 32–36)
MCV RBC AUTO: 94.5 FL — SIGNIFICANT CHANGE UP (ref 80–100)
METHADONE UR-MCNC: NEGATIVE — SIGNIFICANT CHANGE UP
MONOCYTES # BLD AUTO: 0.84 K/UL — SIGNIFICANT CHANGE UP (ref 0–0.9)
MONOCYTES NFR BLD AUTO: 8 % — SIGNIFICANT CHANGE UP (ref 2–14)
MUCOUS THREADS # UR AUTO: SIGNIFICANT CHANGE UP
NEUTROPHILS # BLD AUTO: 6.05 K/UL — SIGNIFICANT CHANGE UP (ref 1.8–7.4)
NEUTROPHILS NFR BLD AUTO: 57.5 % — SIGNIFICANT CHANGE UP (ref 43–77)
NITRITE UR-MCNC: NEGATIVE — SIGNIFICANT CHANGE UP
NRBC # FLD: 0 — SIGNIFICANT CHANGE UP
OPIATES UR-MCNC: NEGATIVE — SIGNIFICANT CHANGE UP
OXYCODONE UR-MCNC: NEGATIVE — SIGNIFICANT CHANGE UP
PCP UR-MCNC: NEGATIVE — SIGNIFICANT CHANGE UP
PH UR: 6 — SIGNIFICANT CHANGE UP (ref 4.6–8)
PLATELET # BLD AUTO: 297 K/UL — SIGNIFICANT CHANGE UP (ref 150–400)
PMV BLD: 10.2 FL — SIGNIFICANT CHANGE UP (ref 7–13)
POTASSIUM SERPL-MCNC: 4 MMOL/L — SIGNIFICANT CHANGE UP (ref 3.5–5.3)
POTASSIUM SERPL-SCNC: 4 MMOL/L — SIGNIFICANT CHANGE UP (ref 3.5–5.3)
PROT SERPL-MCNC: 7.4 G/DL — SIGNIFICANT CHANGE UP (ref 6–8.3)
PROT UR-MCNC: 10 — SIGNIFICANT CHANGE UP
RBC # BLD: 4.35 M/UL — SIGNIFICANT CHANGE UP (ref 3.8–5.2)
RBC # FLD: 12.3 % — SIGNIFICANT CHANGE UP (ref 10.3–14.5)
RBC CASTS # UR COMP ASSIST: SIGNIFICANT CHANGE UP (ref 0–?)
SALICYLATES SERPL-MCNC: < 5 MG/DL — LOW (ref 15–30)
SODIUM SERPL-SCNC: 137 MMOL/L — SIGNIFICANT CHANGE UP (ref 135–145)
SP GR SPEC: 1.02 — SIGNIFICANT CHANGE UP (ref 1–1.03)
SQUAMOUS # UR AUTO: SIGNIFICANT CHANGE UP
TSH SERPL-MCNC: 1.21 UIU/ML — SIGNIFICANT CHANGE UP (ref 0.27–4.2)
UROBILINOGEN FLD QL: 1 E.U. — SIGNIFICANT CHANGE UP (ref 0.1–0.2)
WBC # BLD: 10.51 K/UL — HIGH (ref 3.8–10.5)
WBC # FLD AUTO: 10.51 K/UL — HIGH (ref 3.8–10.5)
WBC UR QL: HIGH (ref 0–?)

## 2017-11-13 PROCEDURE — 99284 EMERGENCY DEPT VISIT MOD MDM: CPT

## 2017-11-13 PROCEDURE — 90792 PSYCH DIAG EVAL W/MED SRVCS: CPT | Mod: GC

## 2017-11-13 NOTE — ED BEHAVIORAL HEALTH ASSESSMENT NOTE - SUMMARY
27 year old female with a history of schizophrenia and cerebral palsy, 3 prior inpatient psychiatric hospitalizations, no prior suicide attempts, no history of violence, presenting for persistent behavioral problems after being treated for catatonia on an inpatient psychiatric unit. Although she left her house last week, which was dangerous, the patient does not have current symptoms of catatonia or psychosis, does not have suicidal or homicidal ideation, intent, or plan, and has been in good behavioral control since. She is calm and cooperative here. She is not an acute risk to herself or others. Her parents have agreed to take her home and supervise her well and feel safe doing so.

## 2017-11-13 NOTE — ED BEHAVIORAL HEALTH ASSESSMENT NOTE - HPI (INCLUDE ILLNESS QUALITY, SEVERITY, DURATION, TIMING, CONTEXT, MODIFYING FACTORS, ASSOCIATED SIGNS AND SYMPTOMS)
27 year old single female, living with her father, stepmother, and 22 year old sister, with a history of schizophrenia, 3 prior inpatient psychiatric hospitalizations (most recently 10/8/17-10/24/17), no prior suicide attempts, no history of violence, no history of substance abuse issues, no history of legal issues, with a PMH of cerebral palsy, presenting after she went to her outpatient psychiatrist last week, Dr. Perez, who recommended that the patient go to the ED for evaluation. The patient reportedly has a history of schizophrenia, and first developed symptoms in January of 2010 when she was not sleeping, was reportedly hallucinating, disorganized, standing in the kitchen naked, cutting coupons, putting hair grease on her body. She was hospitalized at that time, started on Seroquel, which helped. In 2015, she was not taking her medication, stopped sleeping, was urinating on herself, speaking nonsensically, reportedly with catatonia, again hospitalized, started on Risperdal liquid and Ativan, and discharged with outpatient follow up at the [facility]. She developed incontinence on the Risperdal, and was switched to Abilify, which was titrated to 15 mg PO QHS. She developed tremor, so Cogentin was started, 1 mg PO BID. 27 year old single female, living with her father, stepmother, and 22 year old sister, with a history of schizophrenia, 3 prior inpatient psychiatric hospitalizations (most recently 10/8/17-10/24/17), no prior suicide attempts, no history of violence, no history of substance abuse issues, no history of legal issues, with a PMH of cerebral palsy, presenting after she went to her outpatient psychiatrist last week, Dr. Perez, who recommended that the patient go to the ED for evaluation. The patient reportedly has a history of schizophrenia, and first developed symptoms in January of 2010 when she was not sleeping, was reportedly hallucinating, disorganized, standing in the kitchen naked, cutting coupons, putting hair grease on her body. She was hospitalized at that time, started on Seroquel, which helped. In 2015, she was not taking her medication, stopped sleeping, was urinating on herself, speaking nonsensically, reportedly with catatonia, again hospitalized, started on Risperdal liquid and Ativan, and discharged with outpatient follow up at the [facility]. She developed incontinence on the Risperdal, and was switched to Abilify, which was titrated to 15 mg PO QHS. She developed tremor, so Cogentin was started, 1 mg PO BID. She developed behavioral issues, disrobing, blinking oddly, speaking selectively, was thought to be catatonic, found to have elevated CK, and was admitted to [facility] from 10/8/17-10/24/17. She was started on Ativan, titrated to 0.5 mg PO daily, 0.5 mg PO daily at 1 pm, and 1.5 mg PO QHS. She was kept on her other medications. She was discharged, and the next day, was found outside of her home, which was uncharacteristic for her, and she said that she was going to "dance". She also shaved, although without injuring herself, which is also uncharacteristic for her. She saw her outpatient psychiatrist who recommended that she go to the ED for evaluation. Her parents wanted to let her have a weekend at home, and kept her at home, during which time she was in good behavioral control.    In the ED, the patient speaks, although sparingly. She does not appear to be responding to internal stimuli and denies AH/VH/OH/TH/GH, or SI/HI/I/P. She denies mood symptoms or other psychotic symptoms. She denies anxiety symptoms or PTSD symptoms. She was overall in good behavioral control, sitting quietly. She says that she is sleeping well, which her family agrees about.    According to her parents, they feel safe taking her home and will supervise her accordingly.

## 2017-11-13 NOTE — ED PROVIDER NOTE - PHYSICAL EXAMINATION
VSS DENIES PAIN SOB N/V NO S/S OF CARDIAC OR RESPIRATORY DISTRESS PT AMBULATES AND PERFORMS ADL'S  TOLERATING PO INTAKE

## 2017-11-13 NOTE — ED PROVIDER NOTE - MEDICAL DECISION MAKING DETAILS
This is a 27 year old Female PMHx Cerebral Palsy Schizophrenia BIBIA from home for psych eval r/t Bizarre behavior Patient referred for admittance by her psych MD Yudith Kim. Medical evaluation performed. There is no clinical evidence of intoxication or any acute medical problem requiring immediate intervention. Final disposition will be determined by psychiatrist.

## 2017-11-13 NOTE — ED ADULT TRIAGE NOTE - CHIEF COMPLAINT QUOTE
Patient brought to ER from home for exhibiting psychotic behavior. Pt referred for admittance by her psych MD Yudith Kim.

## 2017-11-13 NOTE — ED BEHAVIORAL HEALTH ASSESSMENT NOTE - DESCRIPTION
She lives with her father and stepmother and her 22 year old sister. Patient calm and cooperative. CP, seizures (remotely)

## 2017-11-13 NOTE — ED BEHAVIORAL HEALTH ASSESSMENT NOTE - DETAILS
Patient presented with behavioral issues, which the inpatient team thought were 2/2 catatonia. Patient started on ativan. after hours Her mother  of colon cancer. Incontinence from Risperdal

## 2017-11-13 NOTE — ED PROVIDER NOTE - OBJECTIVE STATEMENT
This is a 27 year old Female PMHx Cerebral Palsy Schizophrenia BIBIA from home for psych eval r/t Bizarre behavior Patient referred for admittance by her psych MD Yudith Kim. Patient with difficulty walking and requires assistance. Parents states the patient crawled to the front door unlocked the door and crawled down the street.  Patients reports rocking behavior. Parents states she is not a threat to herself or family.

## 2017-11-13 NOTE — ED BEHAVIORAL HEALTH ASSESSMENT NOTE - CASE SUMMARY
27 year old female with a history of schizophrenia and cerebral palsy, 3 prior inpatient psychiatric hospitalizations, no prior suicide attempts, no history of violence, presenting for persistent behavioral problems after being treated for catatonia on an inpatient psychiatric unit. Pt and family reported no current or recent suicidal ideation, homicidal ideation, manic or auditory/visual hallucinations. Pt sx do not seem consistent w/ active psychosis that would require inpatient admission. Pt should however have a full work-up and have ddx such as seizure activity , behavior related issues, and residual catatonic sx that can explained her moments of non-response.   Base on  current evaluation pt doesn't meet criteria for psychiatric admission and is more appropriate for outpt care. Pt parent experience no safety concern taking her home.

## 2017-11-13 NOTE — ED BEHAVIORAL HEALTH ASSESSMENT NOTE - CURRENT MEDICATION
Abilify 15 mg PO QHS, Cogentin 1 mg PO BID, Ativan 0.5 mg PO daily, 0.5 mg PO daily at 1 pm, 1.5 mg PO QHS.

## 2017-11-13 NOTE — ED BEHAVIORAL HEALTH ASSESSMENT NOTE - RISK ASSESSMENT
The patient has chronic risk factors, including a history of schizophrenia, prior inpatient psychiatric hospitalizations. She has acute risk factors, including recent risky behavior, recent inpatient psychiatric hospitalization. She has protective factors, including supportive family. She does not have SI/HI/I/P and is not an acute risk to herself or others.

## 2017-11-13 NOTE — ED BEHAVIORAL HEALTH ASSESSMENT NOTE - SAFETY PLAN DETAILS
If patient develops worsening symptoms, behavioral problems, or suicidal or homicidal ideation, patient or parents should call 911 or patient should go to or be brought to the ED. Patient and her parents were in agreement with this.

## 2018-08-31 NOTE — ED BEHAVIORAL HEALTH ASSESSMENT NOTE - PSYCHIATRIC ISSUES AND PLAN (INCLUDE STANDING AND PRN MEDICATION)
Medication Management Service  [facility]  [phone]     CLINICAL PHARMACY NOTE:  Follow-up for Positive STD Test    At the time of Román Mari's visit to [facility] on 08/29/2018 STD testing was performed. DNA testing was positive for Gonorrhea. While in the ED, patient was given azithromycin 1gm orally x1 dose and ceftriazone 250mg IM x 1 dose. Treatment appropriate, no follow up needed at this time. Cassius Beavers, Pharm. D., [address] [facility] Medication Management Service  [phone]  8/31/2018  9:45 AM ativnm haldol, benadryl

## 2021-01-27 PROCEDURE — 99213 OFFICE O/P EST LOW 20 MIN: CPT

## 2021-03-31 PROCEDURE — 99442: CPT

## 2021-05-05 PROCEDURE — 99213 OFFICE O/P EST LOW 20 MIN: CPT | Mod: 95

## 2021-08-30 PROCEDURE — 99213 OFFICE O/P EST LOW 20 MIN: CPT | Mod: 95

## 2021-10-06 PROCEDURE — 99214 OFFICE O/P EST MOD 30 MIN: CPT | Mod: 95

## 2022-02-23 PROCEDURE — 99214 OFFICE O/P EST MOD 30 MIN: CPT | Mod: 95

## 2022-06-15 PROCEDURE — 99213 OFFICE O/P EST LOW 20 MIN: CPT | Mod: 95

## 2022-10-26 PROCEDURE — 99213 OFFICE O/P EST LOW 20 MIN: CPT | Mod: 95

## 2022-12-14 PROCEDURE — 99442: CPT

## 2023-04-05 PROCEDURE — 99213 OFFICE O/P EST LOW 20 MIN: CPT | Mod: 95

## 2023-06-07 PROCEDURE — 99442: CPT

## 2023-07-05 PROCEDURE — 99442: CPT

## 2023-09-03 NOTE — ED PROVIDER NOTE - PROGRESS NOTE3
- possibly cardiac in origin; see chest pain assessment and plan  - Vasovagal etiology also possible  - Per history, patient seems to hydrate well so dehydration is considered less likely   - Orthostatic blood pressures were negative: Supine 132/82, sitting 143/89, standing 152/92  - Echo 9/4/2023 showed normal LV function w/ EF 60%-65%, mild hypertrophy without cardiomegaly or valvular dysfunction. Improved.

## 2023-10-25 PROCEDURE — 99213 OFFICE O/P EST LOW 20 MIN: CPT | Mod: 95

## 2024-01-10 PROCEDURE — 99213 OFFICE O/P EST LOW 20 MIN: CPT | Mod: 95

## 2024-02-14 PROCEDURE — 99213 OFFICE O/P EST LOW 20 MIN: CPT | Mod: 95

## 2024-03-13 PROCEDURE — 99214 OFFICE O/P EST MOD 30 MIN: CPT | Mod: 95

## 2024-05-15 PROCEDURE — 99214 OFFICE O/P EST MOD 30 MIN: CPT | Mod: 95

## 2024-06-19 PROCEDURE — 99213 OFFICE O/P EST LOW 20 MIN: CPT | Mod: 95
